# Patient Record
Sex: FEMALE | Race: BLACK OR AFRICAN AMERICAN | NOT HISPANIC OR LATINO
[De-identification: names, ages, dates, MRNs, and addresses within clinical notes are randomized per-mention and may not be internally consistent; named-entity substitution may affect disease eponyms.]

---

## 2017-08-30 ENCOUNTER — APPOINTMENT (OUTPATIENT)
Dept: OBGYN | Facility: CLINIC | Age: 32
End: 2017-08-30
Payer: COMMERCIAL

## 2017-08-30 VITALS
SYSTOLIC BLOOD PRESSURE: 128 MMHG | BODY MASS INDEX: 46.61 KG/M2 | HEIGHT: 66 IN | WEIGHT: 290 LBS | DIASTOLIC BLOOD PRESSURE: 82 MMHG

## 2017-08-30 PROCEDURE — 99395 PREV VISIT EST AGE 18-39: CPT

## 2017-08-31 LAB — HPV HIGH+LOW RISK DNA PNL CVX: NEGATIVE

## 2017-09-05 LAB — CYTOLOGY CVX/VAG DOC THIN PREP: NORMAL

## 2017-09-11 ENCOUNTER — APPOINTMENT (OUTPATIENT)
Dept: OBGYN | Facility: CLINIC | Age: 32
End: 2017-09-11

## 2017-09-11 ENCOUNTER — RX RENEWAL (OUTPATIENT)
Age: 32
End: 2017-09-11

## 2017-09-28 ENCOUNTER — APPOINTMENT (OUTPATIENT)
Dept: OBGYN | Facility: CLINIC | Age: 32
End: 2017-09-28

## 2017-10-30 ENCOUNTER — APPOINTMENT (OUTPATIENT)
Dept: OBGYN | Facility: CLINIC | Age: 32
End: 2017-10-30

## 2018-04-09 ENCOUNTER — ASOB RESULT (OUTPATIENT)
Age: 33
End: 2018-04-09

## 2018-04-09 ENCOUNTER — APPOINTMENT (OUTPATIENT)
Dept: OBGYN | Facility: CLINIC | Age: 33
End: 2018-04-09
Payer: COMMERCIAL

## 2018-04-09 PROCEDURE — 76830 TRANSVAGINAL US NON-OB: CPT

## 2018-04-17 ENCOUNTER — RX RENEWAL (OUTPATIENT)
Age: 33
End: 2018-04-17

## 2018-04-19 ENCOUNTER — APPOINTMENT (OUTPATIENT)
Dept: OBGYN | Facility: CLINIC | Age: 33
End: 2018-04-19
Payer: COMMERCIAL

## 2018-04-19 PROCEDURE — 99214 OFFICE O/P EST MOD 30 MIN: CPT

## 2018-04-19 RX ORDER — NORETHINDRONE ACETATE AND ETHINYL ESTRADIOL 1MG-20(21)
1-20 KIT ORAL
Qty: 84 | Refills: 0 | Status: DISCONTINUED | COMMUNITY
Start: 2018-04-17 | End: 2018-04-19

## 2018-05-07 ENCOUNTER — MESSAGE (OUTPATIENT)
Age: 33
End: 2018-05-07

## 2018-06-05 ENCOUNTER — APPOINTMENT (OUTPATIENT)
Dept: OBGYN | Facility: CLINIC | Age: 33
End: 2018-06-05
Payer: COMMERCIAL

## 2018-06-05 VITALS
HEIGHT: 66 IN | WEIGHT: 293 LBS | HEART RATE: 90 BPM | SYSTOLIC BLOOD PRESSURE: 115 MMHG | BODY MASS INDEX: 47.09 KG/M2 | DIASTOLIC BLOOD PRESSURE: 78 MMHG

## 2018-06-05 DIAGNOSIS — Z92.89 PERSONAL HISTORY OF OTHER MEDICAL TREATMENT: ICD-10-CM

## 2018-06-05 DIAGNOSIS — Z87.42 PERSONAL HISTORY OF OTHER DISEASES OF THE FEMALE GENITAL TRACT: ICD-10-CM

## 2018-06-05 DIAGNOSIS — R89.6 ABNORMAL CYTOLOGICAL FINDINGS IN SPECIMENS FROM OTHER ORGANS, SYSTEMS AND TISSUES: ICD-10-CM

## 2018-06-05 PROCEDURE — 99215 OFFICE O/P EST HI 40 MIN: CPT

## 2018-06-07 ENCOUNTER — APPOINTMENT (OUTPATIENT)
Dept: MRI IMAGING | Facility: CLINIC | Age: 33
End: 2018-06-07
Payer: COMMERCIAL

## 2018-06-07 ENCOUNTER — OUTPATIENT (OUTPATIENT)
Dept: OUTPATIENT SERVICES | Facility: HOSPITAL | Age: 33
LOS: 1 days | End: 2018-06-07
Payer: COMMERCIAL

## 2018-06-07 DIAGNOSIS — N85.2 HYPERTROPHY OF UTERUS: ICD-10-CM

## 2018-06-07 DIAGNOSIS — D50.0 IRON DEFICIENCY ANEMIA SECONDARY TO BLOOD LOSS (CHRONIC): ICD-10-CM

## 2018-06-07 DIAGNOSIS — D25.9 LEIOMYOMA OF UTERUS, UNSPECIFIED: ICD-10-CM

## 2018-06-07 DIAGNOSIS — N92.0 EXCESSIVE AND FREQUENT MENSTRUATION WITH REGULAR CYCLE: ICD-10-CM

## 2018-06-07 PROCEDURE — A9585: CPT

## 2018-06-07 PROCEDURE — 72197 MRI PELVIS W/O & W/DYE: CPT

## 2018-06-07 PROCEDURE — 72197 MRI PELVIS W/O & W/DYE: CPT | Mod: 26

## 2018-06-11 ENCOUNTER — RESULT REVIEW (OUTPATIENT)
Age: 33
End: 2018-06-11

## 2018-07-12 ENCOUNTER — APPOINTMENT (OUTPATIENT)
Dept: OBGYN | Facility: CLINIC | Age: 33
End: 2018-07-12
Payer: COMMERCIAL

## 2018-07-12 VITALS
BODY MASS INDEX: 47.09 KG/M2 | HEIGHT: 66 IN | WEIGHT: 293 LBS | HEART RATE: 82 BPM | DIASTOLIC BLOOD PRESSURE: 84 MMHG | SYSTOLIC BLOOD PRESSURE: 130 MMHG

## 2018-07-12 PROCEDURE — 99214 OFFICE O/P EST MOD 30 MIN: CPT

## 2018-08-08 ENCOUNTER — RX RENEWAL (OUTPATIENT)
Age: 33
End: 2018-08-08

## 2018-09-05 ENCOUNTER — OUTPATIENT (OUTPATIENT)
Dept: OUTPATIENT SERVICES | Facility: HOSPITAL | Age: 33
LOS: 1 days | End: 2018-09-05
Payer: COMMERCIAL

## 2018-09-05 VITALS
HEIGHT: 66 IN | TEMPERATURE: 97 F | RESPIRATION RATE: 15 BRPM | DIASTOLIC BLOOD PRESSURE: 80 MMHG | WEIGHT: 293 LBS | SYSTOLIC BLOOD PRESSURE: 111 MMHG | HEART RATE: 78 BPM | OXYGEN SATURATION: 97 %

## 2018-09-05 DIAGNOSIS — Z29.9 ENCOUNTER FOR PROPHYLACTIC MEASURES, UNSPECIFIED: ICD-10-CM

## 2018-09-05 DIAGNOSIS — E66.9 OBESITY, UNSPECIFIED: ICD-10-CM

## 2018-09-05 DIAGNOSIS — N85.2 HYPERTROPHY OF UTERUS: ICD-10-CM

## 2018-09-05 DIAGNOSIS — Z01.818 ENCOUNTER FOR OTHER PREPROCEDURAL EXAMINATION: ICD-10-CM

## 2018-09-05 DIAGNOSIS — D25.9 LEIOMYOMA OF UTERUS, UNSPECIFIED: ICD-10-CM

## 2018-09-05 DIAGNOSIS — D50.0 IRON DEFICIENCY ANEMIA SECONDARY TO BLOOD LOSS (CHRONIC): ICD-10-CM

## 2018-09-05 DIAGNOSIS — N92.0 EXCESSIVE AND FREQUENT MENSTRUATION WITH REGULAR CYCLE: ICD-10-CM

## 2018-09-05 LAB
BLD GP AB SCN SERPL QL: NEGATIVE — SIGNIFICANT CHANGE UP
HCT VFR BLD CALC: 43.5 % — SIGNIFICANT CHANGE UP (ref 34.5–45)
HGB BLD-MCNC: 13.8 G/DL — SIGNIFICANT CHANGE UP (ref 11.5–15.5)
MCHC RBC-ENTMCNC: 24.6 PG — LOW (ref 27–34)
MCHC RBC-ENTMCNC: 31.7 GM/DL — LOW (ref 32–36)
MCV RBC AUTO: 77.4 FL — LOW (ref 80–100)
PLATELET # BLD AUTO: 325 K/UL — SIGNIFICANT CHANGE UP (ref 150–400)
RBC # BLD: 5.62 M/UL — HIGH (ref 3.8–5.2)
RBC # FLD: 19.1 % — HIGH (ref 10.3–14.5)
RH IG SCN BLD-IMP: POSITIVE — SIGNIFICANT CHANGE UP
WBC # BLD: 6.68 K/UL — SIGNIFICANT CHANGE UP (ref 3.8–10.5)
WBC # FLD AUTO: 6.68 K/UL — SIGNIFICANT CHANGE UP (ref 3.8–10.5)

## 2018-09-05 PROCEDURE — 85027 COMPLETE CBC AUTOMATED: CPT

## 2018-09-05 PROCEDURE — G0463: CPT

## 2018-09-05 PROCEDURE — 86850 RBC ANTIBODY SCREEN: CPT

## 2018-09-05 PROCEDURE — 86900 BLOOD TYPING SEROLOGIC ABO: CPT

## 2018-09-05 PROCEDURE — 86901 BLOOD TYPING SEROLOGIC RH(D): CPT

## 2018-09-05 RX ORDER — SODIUM CHLORIDE 9 MG/ML
3 INJECTION INTRAMUSCULAR; INTRAVENOUS; SUBCUTANEOUS EVERY 8 HOURS
Qty: 0 | Refills: 0 | Status: DISCONTINUED | OUTPATIENT
Start: 2018-09-14 | End: 2018-09-14

## 2018-09-05 RX ORDER — CEFOTETAN DISODIUM 1 G
2 VIAL (EA) INJECTION ONCE
Qty: 0 | Refills: 0 | Status: DISCONTINUED | OUTPATIENT
Start: 2018-09-14 | End: 2018-09-17

## 2018-09-05 NOTE — H&P PST ADULT - PMH
Anemia  on Iron  Leiomyoma of uterus    Viral encephalitis  hospitalized at [facility] 2011

## 2018-09-05 NOTE — H&P PST ADULT - ASSESSMENT
CAPRINI SCORE [CLOT]    AGE RELATED RISK FACTORS                                                       MOBILITY RELATED FACTORS  [ ] Age 41-60 years                                            (1 Point)                  [ ] Bed rest                                                        (1 Point)  [ ] Age: 61-74 years                                           (2 Points)                 [ ] Plaster cast                                                   (2 Points)  [ ] Age= 75 years                                              (3 Points)                 [ ] Bed bound for more than 72 hours                 (2 Points)    DISEASE RELATED RISK FACTORS                                               GENDER SPECIFIC FACTORS  [ ] Edema in the lower extremities                       (1 Point)                  [ ] Pregnancy                                                     (1 Point)  [ ] Varicose veins                                               (1 Point)                  [ ] Post-partum < 6 weeks                                   (1 Point)             [x] BMI > 25 Kg/m2                                            (1 Point)                  [ ] Hormonal therapy  or oral contraception          (1 Point)                 [ ] Sepsis (in the previous month)                        (1 Point)                  [ ] History of pregnancy complications                 (1 point)  [ ] Pneumonia or serious lung disease                                               [ ] Unexplained or recurrent                     (1 Point)           (in the previous month)                               (1 Point)  [ ] Abnormal pulmonary function test                     (1 Point)                 SURGERY RELATED RISK FACTORS  [ ] Acute myocardial infarction                              (1 Point)                 [ ]  Section                                             (1 Point)  [ ] Congestive heart failure (in the previous month)  (1 Point)               [ ] Minor surgery                                                  (1 Point)   [ ] Inflammatory bowel disease                             (1 Point)                 [ ] Arthroscopic surgery                                        (2 Points)  [ ] Central venous access                                      (2 Points)                [x] General surgery lasting more than 45 minutes   (2 Points)       [ ] Stroke (in the previous month)                          (5 Points)               [ ] Elective arthroplasty                                         (5 Points)                                                                                                                                               HEMATOLOGY RELATED FACTORS                                                 TRAUMA RELATED RISK FACTORS  [ ] Prior episodes of VTE                                     (3 Points)                 [ ] Fracture of the hip, pelvis, or leg                       (5 Points)  [ ] Positive family history for VTE                         (3 Points)                 [ ] Acute spinal cord injury (in the previous month)  (5 Points)  [ ] Prothrombin 85068 A                                     (3 Points)                 [ ] Paralysis  (less than 1 month)                             (5 Points)  [ ] Factor V Leiden                                             (3 Points)                  [ ] Multiple Trauma within 1 month                        (5 Points)  [ ] Lupus anticoagulants                                     (3 Points)                                                           [ ] Anticardiolipin antibodies                               (3 Points)                                                       [ ] High homocysteine in the blood                      (3 Points)                                             [ ] Other congenital or acquired thrombophilia      (3 Points)                                                [ ] Heparin induced thrombocytopenia                  (3 Points)                                          Total Score [   3     ]

## 2018-09-05 NOTE — H&P PST ADULT - HISTORY OF PRESENT ILLNESS
34 y/o AA female with Hx of fibroids 32 y/o AA female with Hx of fibroids 34 y/o AA female with Hx of fibroids c/o worsening menorrhagia, abdominal discomfort and anemia over the past year. Eval by Dr. Jiang-s/p MRI revealed 2 uterine fibroids. Presents to PST for a scheduled abdominal myomectomy on 9/14/2018.

## 2018-09-05 NOTE — H&P PST ADULT - ACTIVITY
walks, grocery shopping, yardwork, able to tolerate moderate activities walks, grocery shopping, yard work, able to participate in moderate activities

## 2018-09-13 ENCOUNTER — TRANSCRIPTION ENCOUNTER (OUTPATIENT)
Age: 33
End: 2018-09-13

## 2018-09-14 ENCOUNTER — RESULT REVIEW (OUTPATIENT)
Age: 33
End: 2018-09-14

## 2018-09-14 ENCOUNTER — INPATIENT (INPATIENT)
Facility: HOSPITAL | Age: 33
LOS: 2 days | Discharge: HOME | DRG: 742 | End: 2018-09-17
Attending: OBSTETRICS & GYNECOLOGY | Admitting: OBSTETRICS & GYNECOLOGY
Payer: COMMERCIAL

## 2018-09-14 ENCOUNTER — APPOINTMENT (OUTPATIENT)
Dept: OBGYN | Facility: HOSPITAL | Age: 33
End: 2018-09-14

## 2018-09-14 ENCOUNTER — TRANSCRIPTION ENCOUNTER (OUTPATIENT)
Age: 33
End: 2018-09-14

## 2018-09-14 VITALS
HEIGHT: 66 IN | SYSTOLIC BLOOD PRESSURE: 110 MMHG | DIASTOLIC BLOOD PRESSURE: 81 MMHG | TEMPERATURE: 98 F | WEIGHT: 293 LBS | OXYGEN SATURATION: 100 % | RESPIRATION RATE: 16 BRPM | HEART RATE: 82 BPM

## 2018-09-14 DIAGNOSIS — D50.0 IRON DEFICIENCY ANEMIA SECONDARY TO BLOOD LOSS (CHRONIC): ICD-10-CM

## 2018-09-14 DIAGNOSIS — D25.9 LEIOMYOMA OF UTERUS, UNSPECIFIED: ICD-10-CM

## 2018-09-14 LAB
HCG UR QL: NEGATIVE — SIGNIFICANT CHANGE UP
HCT VFR BLD CALC: 38.8 % — SIGNIFICANT CHANGE UP (ref 34.5–45)
HGB BLD-MCNC: 12.3 G/DL — SIGNIFICANT CHANGE UP (ref 11.5–15.5)
MCHC RBC-ENTMCNC: 24.7 PG — LOW (ref 27–34)
MCHC RBC-ENTMCNC: 31.7 GM/DL — LOW (ref 32–36)
MCV RBC AUTO: 77.9 FL — LOW (ref 80–100)
PLATELET # BLD AUTO: 237 K/UL — SIGNIFICANT CHANGE UP (ref 150–400)
RBC # BLD: 4.98 M/UL — SIGNIFICANT CHANGE UP (ref 3.8–5.2)
RBC # FLD: 17.7 % — HIGH (ref 10.3–14.5)
WBC # BLD: 13.2 K/UL — HIGH (ref 3.8–10.5)
WBC # FLD AUTO: 13.2 K/UL — HIGH (ref 3.8–10.5)

## 2018-09-14 PROCEDURE — 88305 TISSUE EXAM BY PATHOLOGIST: CPT | Mod: 26

## 2018-09-14 PROCEDURE — 58140 MYOMECTOMY ABDOM METHOD: CPT | Mod: 80

## 2018-09-14 PROCEDURE — 58140 MYOMECTOMY ABDOM METHOD: CPT

## 2018-09-14 RX ORDER — ACETAMINOPHEN 500 MG
975 TABLET ORAL ONCE
Qty: 0 | Refills: 0 | Status: COMPLETED | OUTPATIENT
Start: 2018-09-14 | End: 2018-09-14

## 2018-09-14 RX ORDER — LIDOCAINE HCL 20 MG/ML
0.2 VIAL (ML) INJECTION ONCE
Qty: 0 | Refills: 0 | Status: COMPLETED | OUTPATIENT
Start: 2018-09-14 | End: 2018-09-14

## 2018-09-14 RX ORDER — SODIUM CHLORIDE 9 MG/ML
1000 INJECTION, SOLUTION INTRAVENOUS
Qty: 0 | Refills: 0 | Status: DISCONTINUED | OUTPATIENT
Start: 2018-09-14 | End: 2018-09-15

## 2018-09-14 RX ORDER — OXYCODONE HYDROCHLORIDE 5 MG/1
1 TABLET ORAL
Qty: 8 | Refills: 0 | OUTPATIENT
Start: 2018-09-14 | End: 2018-09-15

## 2018-09-14 RX ORDER — ACETAMINOPHEN 500 MG
1000 TABLET ORAL ONCE
Qty: 0 | Refills: 0 | Status: COMPLETED | OUTPATIENT
Start: 2018-09-14 | End: 2018-09-14

## 2018-09-14 RX ORDER — MEDROXYPROGESTERONE ACETATE 150 MG/ML
1 INJECTION, SUSPENSION, EXTENDED RELEASE INTRAMUSCULAR
Qty: 0 | Refills: 0 | COMMUNITY

## 2018-09-14 RX ORDER — NALOXONE HYDROCHLORIDE 4 MG/.1ML
0.1 SPRAY NASAL
Qty: 0 | Refills: 0 | Status: DISCONTINUED | OUTPATIENT
Start: 2018-09-14 | End: 2018-09-17

## 2018-09-14 RX ORDER — HYDROMORPHONE HYDROCHLORIDE 2 MG/ML
0.5 INJECTION INTRAMUSCULAR; INTRAVENOUS; SUBCUTANEOUS
Qty: 0 | Refills: 0 | Status: DISCONTINUED | OUTPATIENT
Start: 2018-09-14 | End: 2018-09-14

## 2018-09-14 RX ORDER — OXYCODONE HYDROCHLORIDE 5 MG/1
1 TABLET ORAL
Qty: 12 | Refills: 0 | OUTPATIENT
Start: 2018-09-14 | End: 2018-09-16

## 2018-09-14 RX ORDER — HEPARIN SODIUM 5000 [USP'U]/ML
5000 INJECTION INTRAVENOUS; SUBCUTANEOUS EVERY 12 HOURS
Qty: 0 | Refills: 0 | Status: DISCONTINUED | OUTPATIENT
Start: 2018-09-14 | End: 2018-09-17

## 2018-09-14 RX ORDER — ONDANSETRON 8 MG/1
4 TABLET, FILM COATED ORAL EVERY 4 HOURS
Qty: 0 | Refills: 0 | Status: DISCONTINUED | OUTPATIENT
Start: 2018-09-14 | End: 2018-09-14

## 2018-09-14 RX ORDER — FERROUS SULFATE 325(65) MG
1 TABLET ORAL
Qty: 0 | Refills: 0 | COMMUNITY

## 2018-09-14 RX ORDER — CELECOXIB 200 MG/1
200 CAPSULE ORAL ONCE
Qty: 0 | Refills: 0 | Status: COMPLETED | OUTPATIENT
Start: 2018-09-14 | End: 2018-09-14

## 2018-09-14 RX ORDER — ONDANSETRON 8 MG/1
4 TABLET, FILM COATED ORAL EVERY 6 HOURS
Qty: 0 | Refills: 0 | Status: DISCONTINUED | OUTPATIENT
Start: 2018-09-14 | End: 2018-09-17

## 2018-09-14 RX ORDER — FAMOTIDINE 10 MG/ML
20 INJECTION INTRAVENOUS ONCE
Qty: 0 | Refills: 0 | Status: COMPLETED | OUTPATIENT
Start: 2018-09-14 | End: 2018-09-14

## 2018-09-14 RX ORDER — ACETAMINOPHEN 500 MG
650 TABLET ORAL EVERY 6 HOURS
Qty: 0 | Refills: 0 | Status: DISCONTINUED | OUTPATIENT
Start: 2018-09-14 | End: 2018-09-17

## 2018-09-14 RX ORDER — DIPHENHYDRAMINE HCL 50 MG
50 CAPSULE ORAL EVERY 4 HOURS
Qty: 0 | Refills: 0 | Status: DISCONTINUED | OUTPATIENT
Start: 2018-09-14 | End: 2018-09-17

## 2018-09-14 RX ORDER — DOCUSATE SODIUM 100 MG
100 CAPSULE ORAL THREE TIMES A DAY
Qty: 0 | Refills: 0 | Status: DISCONTINUED | OUTPATIENT
Start: 2018-09-14 | End: 2018-09-17

## 2018-09-14 RX ADMIN — Medication 1000 MILLIGRAM(S): at 20:09

## 2018-09-14 RX ADMIN — SODIUM CHLORIDE 125 MILLILITER(S): 9 INJECTION, SOLUTION INTRAVENOUS at 22:27

## 2018-09-14 RX ADMIN — HYDROMORPHONE HYDROCHLORIDE 0.5 MILLIGRAM(S): 2 INJECTION INTRAMUSCULAR; INTRAVENOUS; SUBCUTANEOUS at 12:14

## 2018-09-14 RX ADMIN — HYDROMORPHONE HYDROCHLORIDE 0.5 MILLIGRAM(S): 2 INJECTION INTRAMUSCULAR; INTRAVENOUS; SUBCUTANEOUS at 12:29

## 2018-09-14 RX ADMIN — Medication 400 MILLIGRAM(S): at 19:54

## 2018-09-14 RX ADMIN — Medication 0.2 MILLILITER(S): at 06:03

## 2018-09-14 RX ADMIN — HEPARIN SODIUM 5000 UNIT(S): 5000 INJECTION INTRAVENOUS; SUBCUTANEOUS at 22:28

## 2018-09-14 RX ADMIN — FAMOTIDINE 20 MILLIGRAM(S): 10 INJECTION INTRAVENOUS at 05:54

## 2018-09-14 RX ADMIN — CELECOXIB 200 MILLIGRAM(S): 200 CAPSULE ORAL at 05:54

## 2018-09-14 RX ADMIN — Medication 975 MILLIGRAM(S): at 05:54

## 2018-09-14 RX ADMIN — Medication 100 MILLIGRAM(S): at 22:28

## 2018-09-14 RX ADMIN — SODIUM CHLORIDE 3 MILLILITER(S): 9 INJECTION INTRAMUSCULAR; INTRAVENOUS; SUBCUTANEOUS at 06:02

## 2018-09-14 NOTE — DISCHARGE NOTE ADULT - ADDITIONAL INSTRUCTIONS
Call Dr. Jiang's office to schedule your follow-up appointment next Tuesday 9/25. The uterine beach balloon will be taken out at that time.

## 2018-09-14 NOTE — DISCHARGE NOTE ADULT - MEDICATION SUMMARY - MEDICATIONS TO TAKE
I will START or STAY ON the medications listed below when I get home from the hospital:    oxyCODONE 5 mg oral tablet  -- 1 tab(s) by mouth every 6 hours, As Needed -for severe pain MDD:4 tabs  -- Caution federal law prohibits the transfer of this drug to any person other  than the person for whom it was prescribed.  It is very important that you take or use this exactly as directed.  Do not skip doses or discontinue unless directed by your doctor.  May cause drowsiness.  Alcohol may intensify this effect.  Use care when operating dangerous machinery.  This prescription cannot be refilled.  Using more of this medication than prescribed may cause serious breathing problems.    -- Indication: For Severe pain    Depo-Provera 400 mg/mL intramuscular suspension  -- 1 dose(s) intramuscular every 3 months  -- Indication: For home med I will START or STAY ON the medications listed below when I get home from the hospital:    oxyCODONE 5 mg oral tablet  -- 1 tab(s) by mouth every 6 hours, As Needed -for severe pain MDD:4 tabs  -- Caution federal law prohibits the transfer of this drug to any person other  than the person for whom it was prescribed.  It is very important that you take or use this exactly as directed.  Do not skip doses or discontinue unless directed by your doctor.  May cause drowsiness.  Alcohol may intensify this effect.  Use care when operating dangerous machinery.  This prescription cannot be refilled.  Using more of this medication than prescribed may cause serious breathing problems.    -- Indication: For pain    doxycycline monohydrate 100 mg oral capsule  -- 1 cap(s) by mouth every 12 hours  -- Indication: For Uterine beach    Depo-Provera 400 mg/mL intramuscular suspension  -- 1 dose(s) intramuscular every 3 months  -- Indication: For d/c    Estrace 2 mg oral tablet  -- 1 tab(s) by mouth once a day   -- Do not take this drug if you are pregnant.  It is very important that you take or use this exactly as directed.  Do not skip doses or discontinue unless directed by your doctor.    -- Indication: For Uterine beach

## 2018-09-14 NOTE — DISCHARGE NOTE ADULT - HOSPITAL COURSE
Pt is a 34 y/o F admitted on 9/14/18 for a scheduled surgery who underwent a abdominal myomectomy with Dr. Charles Jiang without complications. See operative note for full details. Patient had uncomplicated hospital course, and met all post operative milestones. At time of discharge patient was voiding , tolerating regular diet, ambulating, stable vital signs, afebrile and pain controlled on PO medications. Pt will have close interval follow up with Dr. Charles Jiang. Pt is a 32 y/o F admitted on 9/14/18 for a scheduled surgery who underwent a abdominal myomectomy with Dr. Charles Jiang without complications. See operative note for full details. Patient met all post operative milestones. Patient experienced post-op tachycardia which was improving at the time of discharge. EKG showing sinus tachycardia and patient asymptomatic. At time of discharge patient was voiding , tolerating regular diet, ambulating, stable vital signs, afebrile and pain controlled on PO medications. Pt will have close interval follow up with Dr. Charles Jiang.                10.1   8.7   )-----------( 202      ( 09-17 @ 06:35 )             30.7                10.1   10.7  )-----------( 187      ( 09-16 @ 06:43 )             31.0                10.7   11.3  )-----------( 207      ( 09-15 @ 14:58 )             34.5                10.9   11.6  )-----------( 220      ( 09-15 @ 07:07 )             33.0                12.3   13.2  )-----------( 237      ( 09-14 @ 14:10 )             38.8

## 2018-09-14 NOTE — DISCHARGE NOTE ADULT - MEDICATION SUMMARY - MEDICATIONS TO STOP TAKING
I will STOP taking the medications listed below when I get home from the hospital:  None I will STOP taking the medications listed below when I get home from the hospital:    Depo-Provera 400 mg/mL intramuscular suspension  -- 1 dose(s) intramuscular every 3 months

## 2018-09-14 NOTE — DISCHARGE NOTE ADULT - CARE PROVIDER_API CALL
Charles Jiagn (MD), Obstetrics and Gynecology  [facility] [facility]  [address] [place] [address]  Phone: [phone]  Fax: [phone]

## 2018-09-14 NOTE — PROGRESS NOTE ADULT - PROBLEM SELECTOR PLAN 1
1. Neuro: Analgesia PRN. acetaminophen   Tablet .. 650 milliGRAM(s) Oral every 6 hours PRN  HYDROmorphone  Injectable 0.5 milliGRAM(s) IV Push every 10 minutes PRN  HYDROmorphone (10 MICROgram(s)/mL) + BUpivacaine 0.0625% in 0.9% Sodium Chloride PCEA 250 milliLiter(s) Epidural PCA Continuous  HYDROmorphone (10 MICROgram(s)/mL) + BUpivacaine 0.0625% in 0.9% Sodium Chloride PCEA Rescue Clinician  Bolus 5 milliLiter(s) Epidural every 15 minutes PRN  ondansetron Injectable 4 milliGRAM(s) IV Push every 6 hours PRN  ondansetron Injectable 4 milliGRAM(s) IV Push every 4 hours PRN   2. Card: mild tachycardia- CBC stable. will monitor VS. CBC in AM.   3. Pulm: Incentive spirometer use. no active issues  4. GI: Advance to regular diet. Anti-emetics PRN.  5. : Castellanos to gravity. uterine balloon in place. Doxycycline ordered  6. Electrolytes: LR@125cc/hr. BMP in AM.   7. DVT ppx w/ PAS while in bed. Early ambulation, initially with assistance then as tolerated.  8. Discharge to floor from PACU when criteria met.   d/w GYN team.

## 2018-09-14 NOTE — DISCHARGE NOTE ADULT - PATIENT PORTAL LINK FT
You can access the [facility] Patient Portal, offered by [facility], by registering with the following website: [email or web address]

## 2018-09-14 NOTE — BRIEF OPERATIVE NOTE - SPECIMENS
14 submucosal fibroids, 15 intramural fibroids 14 submucosal fibroids, 15 intramural fibroids, ovarian lesion

## 2018-09-14 NOTE — PATIENT PROFILE ADULT. - PMH
Anemia  on Iron  Leiomyoma of uterus    Viral encephalitis  hospitalized at [facility] 2011

## 2018-09-14 NOTE — BRIEF OPERATIVE NOTE - OPERATION/FINDINGS
20 wk size uterus with multiple myomas. Large 10cm exophytic fibroid with adhesions to left ovary. Another large 10cm submucosal fibroid noted. Total of 14 submucosal fibroids noted and 15 intramural exised. Entrance into uterine cavity noted, 30cc beach balloon postoperatively.

## 2018-09-14 NOTE — DISCHARGE NOTE ADULT - CARE PLAN
Principal Discharge DX:	Leiomyoma of uterus  Goal:	Recovery  Assessment and plan of treatment:	nothing in vagina (sex, tampons, douching) no heavy lifting (>10 lbs) for 6 weeks. Please return to ER or call your doctors office if pain is worsening, you are unable to keep down food or drink, fever >101, heavy vaginal bleeding. You are able to take a shower regularly.

## 2018-09-15 DIAGNOSIS — Z98.890 OTHER SPECIFIED POSTPROCEDURAL STATES: ICD-10-CM

## 2018-09-15 LAB
BASOPHILS # BLD AUTO: 0 K/UL — SIGNIFICANT CHANGE UP (ref 0–0.2)
BASOPHILS NFR BLD AUTO: 0.1 % — SIGNIFICANT CHANGE UP (ref 0–2)
EOSINOPHIL # BLD AUTO: 0 K/UL — SIGNIFICANT CHANGE UP (ref 0–0.5)
EOSINOPHIL NFR BLD AUTO: 0.1 % — SIGNIFICANT CHANGE UP (ref 0–6)
HCT VFR BLD CALC: 33 % — LOW (ref 34.5–45)
HCT VFR BLD CALC: 34.5 % — SIGNIFICANT CHANGE UP (ref 34.5–45)
HGB BLD-MCNC: 10.7 G/DL — LOW (ref 11.5–15.5)
HGB BLD-MCNC: 10.9 G/DL — LOW (ref 11.5–15.5)
LYMPHOCYTES # BLD AUTO: 1.6 K/UL — SIGNIFICANT CHANGE UP (ref 1–3.3)
LYMPHOCYTES # BLD AUTO: 14.1 % — SIGNIFICANT CHANGE UP (ref 13–44)
MCHC RBC-ENTMCNC: 24.2 PG — LOW (ref 27–34)
MCHC RBC-ENTMCNC: 25.6 PG — LOW (ref 27–34)
MCHC RBC-ENTMCNC: 31 GM/DL — LOW (ref 32–36)
MCHC RBC-ENTMCNC: 33 GM/DL — SIGNIFICANT CHANGE UP (ref 32–36)
MCV RBC AUTO: 77.5 FL — LOW (ref 80–100)
MCV RBC AUTO: 77.9 FL — LOW (ref 80–100)
MONOCYTES # BLD AUTO: 0.6 K/UL — SIGNIFICANT CHANGE UP (ref 0–0.9)
MONOCYTES NFR BLD AUTO: 5.5 % — SIGNIFICANT CHANGE UP (ref 2–14)
NEUTROPHILS # BLD AUTO: 9.3 K/UL — HIGH (ref 1.8–7.4)
NEUTROPHILS NFR BLD AUTO: 80.3 % — HIGH (ref 43–77)
PLATELET # BLD AUTO: 207 K/UL — SIGNIFICANT CHANGE UP (ref 150–400)
PLATELET # BLD AUTO: 220 K/UL — SIGNIFICANT CHANGE UP (ref 150–400)
RBC # BLD: 4.26 M/UL — SIGNIFICANT CHANGE UP (ref 3.8–5.2)
RBC # BLD: 4.44 M/UL — SIGNIFICANT CHANGE UP (ref 3.8–5.2)
RBC # FLD: 17.5 % — HIGH (ref 10.3–14.5)
RBC # FLD: 17.5 % — HIGH (ref 10.3–14.5)
WBC # BLD: 11.3 K/UL — HIGH (ref 3.8–10.5)
WBC # BLD: 11.6 K/UL — HIGH (ref 3.8–10.5)
WBC # FLD AUTO: 11.3 K/UL — HIGH (ref 3.8–10.5)
WBC # FLD AUTO: 11.6 K/UL — HIGH (ref 3.8–10.5)

## 2018-09-15 PROCEDURE — 99024 POSTOP FOLLOW-UP VISIT: CPT

## 2018-09-15 PROCEDURE — 93010 ELECTROCARDIOGRAM REPORT: CPT

## 2018-09-15 RX ORDER — ACETAMINOPHEN 500 MG
1000 TABLET ORAL ONCE
Qty: 0 | Refills: 0 | Status: COMPLETED | OUTPATIENT
Start: 2018-09-15 | End: 2018-09-15

## 2018-09-15 RX ORDER — SIMETHICONE 80 MG/1
80 TABLET, CHEWABLE ORAL EVERY 6 HOURS
Qty: 0 | Refills: 0 | Status: DISCONTINUED | OUTPATIENT
Start: 2018-09-15 | End: 2018-09-17

## 2018-09-15 RX ORDER — OXYCODONE HYDROCHLORIDE 5 MG/1
5 TABLET ORAL EVERY 4 HOURS
Qty: 0 | Refills: 0 | Status: DISCONTINUED | OUTPATIENT
Start: 2018-09-15 | End: 2018-09-17

## 2018-09-15 RX ORDER — SODIUM CHLORIDE 9 MG/ML
3 INJECTION INTRAMUSCULAR; INTRAVENOUS; SUBCUTANEOUS EVERY 8 HOURS
Qty: 0 | Refills: 0 | Status: DISCONTINUED | OUTPATIENT
Start: 2018-09-15 | End: 2018-09-17

## 2018-09-15 RX ORDER — SODIUM CHLORIDE 9 MG/ML
500 INJECTION, SOLUTION INTRAVENOUS ONCE
Qty: 0 | Refills: 0 | Status: DISCONTINUED | OUTPATIENT
Start: 2018-09-15 | End: 2018-09-15

## 2018-09-15 RX ADMIN — SODIUM CHLORIDE 3 MILLILITER(S): 9 INJECTION INTRAMUSCULAR; INTRAVENOUS; SUBCUTANEOUS at 21:15

## 2018-09-15 RX ADMIN — HEPARIN SODIUM 5000 UNIT(S): 5000 INJECTION INTRAVENOUS; SUBCUTANEOUS at 12:21

## 2018-09-15 RX ADMIN — OXYCODONE HYDROCHLORIDE 5 MILLIGRAM(S): 5 TABLET ORAL at 15:30

## 2018-09-15 RX ADMIN — Medication 100 MILLIGRAM(S): at 21:09

## 2018-09-15 RX ADMIN — Medication 1000 MILLIGRAM(S): at 06:48

## 2018-09-15 RX ADMIN — Medication 650 MILLIGRAM(S): at 15:30

## 2018-09-15 RX ADMIN — OXYCODONE HYDROCHLORIDE 5 MILLIGRAM(S): 5 TABLET ORAL at 21:12

## 2018-09-15 RX ADMIN — OXYCODONE HYDROCHLORIDE 5 MILLIGRAM(S): 5 TABLET ORAL at 21:45

## 2018-09-15 RX ADMIN — HEPARIN SODIUM 5000 UNIT(S): 5000 INJECTION INTRAVENOUS; SUBCUTANEOUS at 21:09

## 2018-09-15 RX ADMIN — Medication 650 MILLIGRAM(S): at 14:38

## 2018-09-15 RX ADMIN — Medication 100 MILLIGRAM(S): at 12:21

## 2018-09-15 RX ADMIN — Medication 400 MILLIGRAM(S): at 06:33

## 2018-09-15 RX ADMIN — OXYCODONE HYDROCHLORIDE 5 MILLIGRAM(S): 5 TABLET ORAL at 14:37

## 2018-09-15 NOTE — PROGRESS NOTE ADULT - PROBLEM SELECTOR PLAN 1
Neuro: d/c PCEA, transition to PO pain meds  CV: hemodynamically stable, AM labs with appropriate drop in H&H  Pulm: saturating well on room air, encourage incentive spirometry  GI: advance to regular diet  : UOP adequate, uterine beach in place  Heme: c/w HSQ and SCDs for DVT ppx  Dispo: continue routine post-op care    D/w attending Dr. Apolinar Seo MD PGY1 Neuro: d/c PCEA, transition to PO pain meds  CV: hemodynamically stable, AM labs with appropriate drop in H&H  Pulm: saturating well on room air, encourage incentive spirometry  GI: advance to regular diet  : UOP adequate, uterine beach in place  Heme: c/w HSQ and SCDs for DVT ppx  Dispo: continue routine post-op care    D/w attending Dr. Apolinar Seo MD PGY2

## 2018-09-15 NOTE — CHART NOTE - NSCHARTNOTEFT_GEN_A_CORE
URBAN MANCIA [id number]    R2 GYN Chart note    POD#1   HD#2    Pt seen at bedside due to tachycardia overnight.    Patient seen and examined at bedside.  Pt reports she has increased pain this AM compared to yesterday evening.  Pt reports increased mucous secretions since yesterday because she is nervous to cough due to abd pain. Castellanos is still in place.   Denies CP, SOB, N/V, fevers, and chills.    Vital Signs Last 24 Hours  T(C): 37.7 (09-15-18 @ 04:57), Max: 37.7 (09-15-18 @ 04:57)  HR: 120 (09-15-18 @ 05:04) (82 - 124)  BP: 107/74 (09-15-18 @ 04:57) (101/67 - 133/83)  RR: 18 (09-15-18 @ 04:57) (16 - 18)  SpO2: 96% (09-15-18 @ 04:57) (93% - 100%)    I&O's Summary    14 Sep 2018 07:01  -  15 Sep 2018 05:45  --------------------------------------------------------  IN: 2650 mL / OUT: 2985 mL / NET: -335 mL      Physical Exam:  General: NAD  CV: tachycardic to 120s, S1, S2, no M/R/G  Lungs: inspiratory and expiratory wheeze b/l, resolved after pt coughed   Abdomen: Soft, appropriately tender, mildly distended, +BS  Incision:  clean/dry/intact- dermabond  Ext: No pain or swelling     EKG: normal sinus rhythm, sinus tachycardia    Labs:                        12.3   13.2  )-----------( 237      ( 14 Sep 2018 14:10 )             38.8       A/P: 33y  s/p abd myomectomy.  Pt seen due to tachycardia.  Pt tachycardic throughout post operative course.  ddx: depleted intravascular volume due to blood loss, URI due to b/l wheezes, tachycardia due to increased pain. Low suspicion for cardiac abnormality given normal EKG. Low suspicion for intraabd bleeding due to adequate UOP and otherwise normal vitals.     Neuro: PCEA, tylenol  CV: Pending AM CBC  Pulm: Saturating well on room air, encourage oob/amb, incentive spirometer.  Possible URI- consider adding Robitussin to meds- will discuss with attending   GI: Continue regular diet  : UOP adequate  Heme: c/w HSQ and SCDs for DVT ppx  Dispo: Continue routine post-op care    Hanna Vogt PGY-2

## 2018-09-16 LAB
HCT VFR BLD CALC: 31 % — LOW (ref 34.5–45)
HGB BLD-MCNC: 10.1 G/DL — LOW (ref 11.5–15.5)
MCHC RBC-ENTMCNC: 25.5 PG — LOW (ref 27–34)
MCHC RBC-ENTMCNC: 32.6 GM/DL — SIGNIFICANT CHANGE UP (ref 32–36)
MCV RBC AUTO: 78.1 FL — LOW (ref 80–100)
PLATELET # BLD AUTO: 187 K/UL — SIGNIFICANT CHANGE UP (ref 150–400)
RBC # BLD: 3.97 M/UL — SIGNIFICANT CHANGE UP (ref 3.8–5.2)
RBC # FLD: 17.1 % — HIGH (ref 10.3–14.5)
WBC # BLD: 10.7 K/UL — HIGH (ref 3.8–10.5)
WBC # FLD AUTO: 10.7 K/UL — HIGH (ref 3.8–10.5)

## 2018-09-16 PROCEDURE — 99024 POSTOP FOLLOW-UP VISIT: CPT

## 2018-09-16 RX ORDER — MAGNESIUM HYDROXIDE 400 MG/1
30 TABLET, CHEWABLE ORAL ONCE
Qty: 0 | Refills: 0 | Status: COMPLETED | OUTPATIENT
Start: 2018-09-16 | End: 2018-09-16

## 2018-09-16 RX ADMIN — Medication 100 MILLIGRAM(S): at 10:54

## 2018-09-16 RX ADMIN — SIMETHICONE 80 MILLIGRAM(S): 80 TABLET, CHEWABLE ORAL at 12:28

## 2018-09-16 RX ADMIN — SIMETHICONE 80 MILLIGRAM(S): 80 TABLET, CHEWABLE ORAL at 18:30

## 2018-09-16 RX ADMIN — Medication 100 MILLIGRAM(S): at 21:12

## 2018-09-16 RX ADMIN — ONDANSETRON 4 MILLIGRAM(S): 8 TABLET, FILM COATED ORAL at 14:20

## 2018-09-16 RX ADMIN — HEPARIN SODIUM 5000 UNIT(S): 5000 INJECTION INTRAVENOUS; SUBCUTANEOUS at 10:19

## 2018-09-16 RX ADMIN — SODIUM CHLORIDE 3 MILLILITER(S): 9 INJECTION INTRAMUSCULAR; INTRAVENOUS; SUBCUTANEOUS at 21:09

## 2018-09-16 RX ADMIN — SODIUM CHLORIDE 3 MILLILITER(S): 9 INJECTION INTRAMUSCULAR; INTRAVENOUS; SUBCUTANEOUS at 14:20

## 2018-09-16 RX ADMIN — SIMETHICONE 80 MILLIGRAM(S): 80 TABLET, CHEWABLE ORAL at 00:14

## 2018-09-16 RX ADMIN — SIMETHICONE 80 MILLIGRAM(S): 80 TABLET, CHEWABLE ORAL at 05:21

## 2018-09-16 RX ADMIN — MAGNESIUM HYDROXIDE 30 MILLILITER(S): 400 TABLET, CHEWABLE ORAL at 10:54

## 2018-09-16 RX ADMIN — HEPARIN SODIUM 5000 UNIT(S): 5000 INJECTION INTRAVENOUS; SUBCUTANEOUS at 21:12

## 2018-09-16 RX ADMIN — SODIUM CHLORIDE 3 MILLILITER(S): 9 INJECTION INTRAMUSCULAR; INTRAVENOUS; SUBCUTANEOUS at 05:24

## 2018-09-16 NOTE — PROGRESS NOTE ADULT - PROBLEM SELECTOR PLAN 1
Neuro: c/w PO pain meds  CV: tachycardic, AM labs pending  Pulm: saturating well on room air, encourage incentive spirometry  GI: c/w regular diet  : voiding spontaneously  Heme: c/w HSQ and SCDs for DVT ppx  Dispo: continue routine post-op care    Neha Seo MD PGY2

## 2018-09-16 NOTE — PROVIDER CONTACT NOTE (OTHER) - SITUATION
s/p myomectomy
pt  with c/o palpitation while trasfering pt to chair from bed for the first time after surg.

## 2018-09-16 NOTE — PROGRESS NOTE ADULT - ATTENDING COMMENTS
Patient seen and examined  Agree with Above assessment and plan  will cont to monitor heart rate
Gyn Attg:     patient with postoperative tachycardia, adequate urine output asymptomatic.  Likely secondary to intraoperative blood loss will continue to monitor plan for repeat blood work at 2pm.

## 2018-09-17 VITALS
OXYGEN SATURATION: 98 % | HEART RATE: 95 BPM | DIASTOLIC BLOOD PRESSURE: 82 MMHG | RESPIRATION RATE: 18 BRPM | TEMPERATURE: 99 F | SYSTOLIC BLOOD PRESSURE: 118 MMHG

## 2018-09-17 LAB
HCT VFR BLD CALC: 30.7 % — LOW (ref 34.5–45)
HGB BLD-MCNC: 10.1 G/DL — LOW (ref 11.5–15.5)
MCHC RBC-ENTMCNC: 25.6 PG — LOW (ref 27–34)
MCHC RBC-ENTMCNC: 32.9 GM/DL — SIGNIFICANT CHANGE UP (ref 32–36)
MCV RBC AUTO: 77.8 FL — LOW (ref 80–100)
PLATELET # BLD AUTO: 202 K/UL — SIGNIFICANT CHANGE UP (ref 150–400)
RBC # BLD: 3.94 M/UL — SIGNIFICANT CHANGE UP (ref 3.8–5.2)
RBC # FLD: 17 % — HIGH (ref 10.3–14.5)
WBC # BLD: 8.7 K/UL — SIGNIFICANT CHANGE UP (ref 3.8–10.5)
WBC # FLD AUTO: 8.7 K/UL — SIGNIFICANT CHANGE UP (ref 3.8–10.5)

## 2018-09-17 PROCEDURE — 93005 ELECTROCARDIOGRAM TRACING: CPT

## 2018-09-17 PROCEDURE — C1889: CPT

## 2018-09-17 PROCEDURE — 85027 COMPLETE CBC AUTOMATED: CPT

## 2018-09-17 PROCEDURE — C1765: CPT

## 2018-09-17 PROCEDURE — 88305 TISSUE EXAM BY PATHOLOGIST: CPT

## 2018-09-17 PROCEDURE — 81025 URINE PREGNANCY TEST: CPT

## 2018-09-17 RX ORDER — OXYCODONE HYDROCHLORIDE 5 MG/1
1 TABLET ORAL
Qty: 10 | Refills: 0 | OUTPATIENT
Start: 2018-09-17

## 2018-09-17 RX ADMIN — OXYCODONE HYDROCHLORIDE 5 MILLIGRAM(S): 5 TABLET ORAL at 00:57

## 2018-09-17 RX ADMIN — OXYCODONE HYDROCHLORIDE 5 MILLIGRAM(S): 5 TABLET ORAL at 12:18

## 2018-09-17 RX ADMIN — SODIUM CHLORIDE 3 MILLILITER(S): 9 INJECTION INTRAMUSCULAR; INTRAVENOUS; SUBCUTANEOUS at 05:54

## 2018-09-17 RX ADMIN — HEPARIN SODIUM 5000 UNIT(S): 5000 INJECTION INTRAVENOUS; SUBCUTANEOUS at 10:58

## 2018-09-17 RX ADMIN — Medication 100 MILLIGRAM(S): at 10:58

## 2018-09-17 RX ADMIN — OXYCODONE HYDROCHLORIDE 5 MILLIGRAM(S): 5 TABLET ORAL at 01:27

## 2018-09-17 RX ADMIN — SIMETHICONE 80 MILLIGRAM(S): 80 TABLET, CHEWABLE ORAL at 09:39

## 2018-09-17 NOTE — PROGRESS NOTE ADULT - ASSESSMENT
33yoF PMH iron def anemia and fibroid uterus now POD#2 S/P abdominal myomectomy. Tachycardia post-op. Doing well post-op.
A/P: 33y Female PMH iron def anemia and fibroid uterus now POD 0 S/P abdominal myomectomy- doing well
A/P: 34yo s/p abdominal myomectomy POD# 3 for fibroid uterus. Patient w/ post-op tachycardia, but hemodynamically stable and doing well.
33yoF PMH iron def anemia and fibroid uterus now POD#1 S/P abdominal myomectomy. Tachycardia overnight. Doing well post-op.

## 2018-09-17 NOTE — PROGRESS NOTE ADULT - PROBLEM SELECTOR PLAN 1
Neuro: c/w PO pain meds  CV: hemodynamically stable  Pulm: saturating well on room air, encourage incentive spirometry  GI: c/w regular diet  : voiding spontaneously  Heme: c/w HSQ and SCDs for DVT ppx  Dispo: continue routine post-op care    Georgette Willson, PGY-1

## 2018-09-17 NOTE — PROGRESS NOTE ADULT - SUBJECTIVE AND OBJECTIVE BOX
[facility]    SUBJECTIVE: Patient doing well with PCEA    Pain Scale Score:   Refer to charted pain scores    THERAPY:  [X] Epidural Bupivacaine 0.0625% and Hydromorphone         [X] 10 micrograms/mL 	[ ] 5 micrograms/mL  [ ] Epidural Bupivacaine 0.0625% and Fentanyl 2 micrograms/mL  [ ] Epidural Ropivacaine 0.1% plain – 1 mg/mL    Demand dose: 3 mL  Lockout: 15 minutes  Continuous Rate: 6 mL/hr    MEDICATIONS  (STANDING):  cefoTEtan  IVPB 2 Gram(s) IV Intermittent once  doxycycline hyclate Capsule 100 milliGRAM(s) Oral every 12 hours  heparin  Injectable 5000 Unit(s) SubCutaneous every 12 hours  lactated ringers. 1000 milliLiter(s) (125 mL/Hr) IV Continuous <Continuous>    MEDICATIONS  (PRN):  acetaminophen   Tablet .. 650 milliGRAM(s) Oral every 6 hours PRN Mild Pain (1 - 3)  diphenhydrAMINE   Injectable 50 milliGRAM(s) IV Push every 4 hours PRN pruritis or nausea or sleeplessness  docusate sodium 100 milliGRAM(s) Oral three times a day PRN Stool Softening  naloxone Injectable 0.1 milliGRAM(s) IV Push every 3 minutes PRN For ANY of the following changes in patient status:  A. RR LESS THAN 10 breaths per minute, B. Oxygen saturation LESS THAN 90%, C. Sedation score of 6  ondansetron Injectable 4 milliGRAM(s) IV Push every 6 hours PRN nasea or pruritis  oxyCODONE    IR 5 milliGRAM(s) Oral every 4 hours PRN Severe Pain (7 - 10)      OBJECTIVE:    Assessment of Catheter Site:    [X] Epidural 	  [X] Dressing intact	[X] Site non-tender	[X] Site without erythema, discharge, edema  [ ] Epidural tubing and connection checked	[X] Gross neurological exam within normal limits  [X] Catheter removed – tip intact		[X] Afebrile	            [ ] Febrile: ___                          10.9   11.6  )-----------( 220      ( 15 Sep 2018 07:07 )             33.0     Vital Signs Last 24 Hrs  T(C): 36.7 (09-15-18 @ 10:09), Max: 37.7 (09-15-18 @ 04:57)  T(F): 98 (09-15-18 @ 10:09), Max: 99.8 (09-15-18 @ 04:57)  HR: 120 (09-15-18 @ 05:04) (103 - 124)  BP: 107/74 (09-15-18 @ 04:57) (101/67 - 133/83)  BP(mean): 81 (09-14-18 @ 15:15) (81 - 96)  RR: 18 (09-15-18 @ 04:57) (16 - 18)  SpO2: 94% (09-15-18 @ 10:09) (93% - 100%)      Sedation Score:	[X] Alert	[ ] Drowsy	[ ] Arousable  [ ] Asleep     [ ] Unresponsive    Side Effects:	[X] None	[ ] Nausea	[ ] Vomiting   [ ] Pruritus  		[ ] Weakness     [ ] Numbness	[ ] Other:    ASSESSMENT/ PLAN:    Therapy:	[ ] Continue   [X] Discontinue   [X] Change to PRN Analgesics   [ ] Change to PCA    Documentation and Verification of current medications:  [X] Done	[ ] Not done, not eligible, reason:
GYN Progress Note  33y s/p abdominal myomectomy POD#3  HD#4    Patient seen and examined at bedside. No acute overnight events. No acute complaints, pain well controlled. Pt had nausea and emesis x 1 overnight & given anti-emetic.   Patient is ambulating and tolerating clears. Passed flatus. Castellanos is still in place. Pt found to be tachycardic to the 120s in VS monitoring yesterday. Denies CP, SOB, palpitations, fevers, and chills.    Vital Signs Last 24 Hours  T(C): 36.8 (09-17-18 @ 05:47), Max: 37.5 (09-16-18 @ 14:48)  HR: 102 (09-17-18 @ 05:47) (101 - 123)  BP: 112/72 (09-17-18 @ 05:47) (94/60 - 144/92)  RR: 18 (09-17-18 @ 05:47) (17 - 18)  SpO2: 99% (09-17-18 @ 05:47) (95% - 100%)      09-15-18 @ 07:01  -  09-16-18 @ 07:00  --------------------------------------------------------  IN: 0.09 mL/kg/hr / OUT: 0.66 mL/kg/hr / NET: -0.57 mL/kg/hr    09-16-18 @ 07:01  -  09-17-18 @ 06:45  --------------------------------------------------------  IN: 0.03 mL/kg/hr / OUT: 0.13 mL/kg/hr / NET: -0.1 mL/kg/hr        Physical Exam:  General: NAD, lying comfortably in bed  CV: normal rate (HR 96), regular rhythm, S1+/S2+  Lungs: CTA bilaterally  Abdomen: Soft, appropriately tender, non-distended, +BS  Incision: pfannenstiel incision w/ dermabond, Clean, dry, intact  Ext: No erythema or edema bilaterally    Labs:                        10.1   10.7  )-----------( 187      ( 16 Sep 2018 06:43 )             31.0   baso x      eos x      imm gran x      lymph x      mono x      poly x                            10.7   11.3  )-----------( 207      ( 15 Sep 2018 14:58 )             34.5   baso x      eos x      imm gran x      lymph x      mono x      poly x                            10.9   11.6  )-----------( 220      ( 15 Sep 2018 07:07 )             33.0   baso 0.1    eos 0.1    imm gran x      lymph 14.1   mono 5.5    poly 80.3                         12.3   13.2  )-----------( 237      ( 14 Sep 2018 14:10 )             38.8   baso x      eos x      imm gran x      lymph x      mono x      poly x            MEDICATIONS  (STANDING):  cefoTEtan  IVPB 2 Gram(s) IV Intermittent once  doxycycline hyclate Capsule 100 milliGRAM(s) Oral every 12 hours  heparin  Injectable 5000 Unit(s) SubCutaneous every 12 hours  simethicone 80 milliGRAM(s) Chew every 6 hours  sodium chloride 0.9% lock flush 3 milliLiter(s) IV Push every 8 hours    MEDICATIONS  (PRN):  acetaminophen   Tablet .. 650 milliGRAM(s) Oral every 6 hours PRN Mild Pain (1 - 3)  diphenhydrAMINE   Injectable 50 milliGRAM(s) IV Push every 4 hours PRN pruritis or nausea or sleeplessness  docusate sodium 100 milliGRAM(s) Oral three times a day PRN Stool Softening  naloxone Injectable 0.1 milliGRAM(s) IV Push every 3 minutes PRN For ANY of the following changes in patient status:  A. RR LESS THAN 10 breaths per minute, B. Oxygen saturation LESS THAN 90%, C. Sedation score of 6  ondansetron Injectable 4 milliGRAM(s) IV Push every 6 hours PRN nasea or pruritis  oxyCODONE    IR 5 milliGRAM(s) Oral every 4 hours PRN Severe Pain (7 - 10)
POST-OP CHECK    Allergies:  No Known Allergies    S: Pt awake and alert resting comfortably in bed.  Pain controlled. Pt denies N/V, SOB, CP, palpitations. Tolerates ice chips.  Not OOB yet.    O:   T(C): --  HR: 103 (09-14-18 @ 15:00) (103 - 115)  BP: 109/66 (09-14-18 @ 15:00) (109/66 - 128/73)  RR: 16 (09-14-18 @ 15:00) (16 - 18)  SpO2: 100% (09-14-18 @ 15:00) (99% - 100%)  Wt(kg): --  I&O's Summary    14 Sep 2018 07:01  -  14 Sep 2018 15:11  --------------------------------------------------------  IN: 500 mL / OUT: 460 mL / NET: 40 mL        CV: S1S2, RRR  Lungs: CTA B/L  Abd: soft, appropriately tender, occasional BS x 4 quadrants  Inc: Clean/dry/intact  : no active bleeding- uterine balloon in place- pads x 2 noted to be saturated- changed 3.5 hrs prior  Ext: PAS in place, Neg Homans B/L                 12.3   13.2  )-----------( 237      ( 09-14 @ 14:10 )             38.8       Brief Op Note  Procedure:  Abdominal myomectomy  09/14/2018    Active  [id number].      Operative Findings:  · Operative Findings	20 wk size uterus with multiple myomas. Large 10cm exophytic fibroid with adhesions to left ovary. Another large 10cm submucosal fibroid noted. Total of 14 submucosal fibroids noted and 15 intramural exised. Entrance into uterine cavity noted, 30cc beach balloon postoperatively.	    Specimens/Blood Loss/IV/Output/Protocol/VTE:   Specimens/Blood Loss/IV/Output/Protocol/VTE:  · Specimens	14 submucosal fibroids, 15 intramural fibroids	  · Estimated Blood Loss	500 milliLiter(s)	  · IV Infusions - Crystalloids	3000	  · Urine Output	700 milliLiter(s)
Patient seen and examined at bedside, no acute overnight events. No acute complaints, pain well controlled. Patient is ambulating, voiding spontaneously, and tolerating regular diet. Pt is yet to pass flatus or have a BM. Denies CP, SOB, N/V, fevers, chills, or any other concerns.    Vital Signs Last 24 Hours  T(C): 37.7 (09-16-18 @ 05:20), Max: 37.7 (09-16-18 @ 01:40)  HR: 123 (09-16-18 @ 05:20) (117 - 128)  BP: 111/78 (09-16-18 @ 05:20) (107/74 - 128/76)  RR: 18 (09-16-18 @ 05:20) (18 - 18)  SpO2: 97% (09-16-18 @ 05:20) (94% - 97%)    I&O's Summary    15 Sep 2018 07:01  -  16 Sep 2018 07:00  --------------------------------------------------------  IN: 300 mL / OUT: 2250 mL / NET: -1950 mL        Physical Exam:  General: NAD  CV: NR, RR, S1, S2, no M/R/G  Lungs: CTAB  Abdomen: soft, non-tender, non-distended, +BS  Incision: pfannenstiel incision CDI, dermabond intact  Ext: no pain or swelling    Labs:             10.7<L>  11.3<H> )-----------( 207      ( 09-15 @ 14:58 )             34.5                10.9<L>  11.6<H> )-----------( 220      ( 09-15 @ 07:07 )             33.0<L>               12.3   13.2<H> )-----------( 237      ( 09-14 @ 14:10 )             38.8         MEDICATIONS  (STANDING):  cefoTEtan  IVPB 2 Gram(s) IV Intermittent once  doxycycline hyclate Capsule 100 milliGRAM(s) Oral every 12 hours  heparin  Injectable 5000 Unit(s) SubCutaneous every 12 hours  simethicone 80 milliGRAM(s) Chew every 6 hours  sodium chloride 0.9% lock flush 3 milliLiter(s) IV Push every 8 hours    MEDICATIONS  (PRN):  acetaminophen   Tablet .. 650 milliGRAM(s) Oral every 6 hours PRN Mild Pain (1 - 3)  diphenhydrAMINE   Injectable 50 milliGRAM(s) IV Push every 4 hours PRN pruritis or nausea or sleeplessness  docusate sodium 100 milliGRAM(s) Oral three times a day PRN Stool Softening  naloxone Injectable 0.1 milliGRAM(s) IV Push every 3 minutes PRN For ANY of the following changes in patient status:  A. RR LESS THAN 10 breaths per minute, B. Oxygen saturation LESS THAN 90%, C. Sedation score of 6  ondansetron Injectable 4 milliGRAM(s) IV Push every 6 hours PRN nasea or pruritis  oxyCODONE    IR 5 milliGRAM(s) Oral every 4 hours PRN Severe Pain (7 - 10)
Patient seen and examined at bedside. Patient tachycardic overnight with EKG showing sinus tach. No acute complaints, pain well controlled. Patient is tolerating clears but not yet ambulating. Has not yet passed flatus. Castellanos is still in place. Denies CP, SOB, N/V, fevers, chills, or any other concerns.    Vital Signs Last 24 Hours  T(C): 37.7 (09-15-18 @ 04:57), Max: 37.7 (09-15-18 @ 04:57)  HR: 120 (09-15-18 @ 05:04) (103 - 124)  BP: 107/74 (09-15-18 @ 04:57) (101/67 - 133/83)  RR: 18 (09-15-18 @ 04:57) (16 - 18)  SpO2: 96% (09-15-18 @ 04:57) (93% - 100%)    I&O's Summary    14 Sep 2018 07:01  -  15 Sep 2018 07:00  --------------------------------------------------------  IN: 3140 mL / OUT: 2985 mL / NET: 155 mL        Physical Exam:  General: NAD  CV: NR, RR, S1, S2, no M/R/G  Lungs: CTAB  Abdomen: soft, non-tender, non-distended, +BS  Incision: pfannenstiel incision CDI, steri-strips in place  Ext: no pain or swelling    Labs:             10.9<L>  11.6<H> )-----------( 220      ( 09-15 @ 07:07 )             33.0<L>               12.3   13.2<H> )-----------( 237      ( 09-14 @ 14:10 )             38.8         MEDICATIONS  (STANDING):  cefoTEtan  IVPB 2 Gram(s) IV Intermittent once  doxycycline hyclate Capsule 100 milliGRAM(s) Oral every 12 hours  heparin  Injectable 5000 Unit(s) SubCutaneous every 12 hours  HYDROmorphone (10 MICROgram(s)/mL) + BUpivacaine 0.0625% in 0.9% Sodium Chloride PCEA 250 milliLiter(s) Epidural PCA Continuous  lactated ringers. 1000 milliLiter(s) (125 mL/Hr) IV Continuous <Continuous>    MEDICATIONS  (PRN):  acetaminophen   Tablet .. 650 milliGRAM(s) Oral every 6 hours PRN Mild Pain (1 - 3)  diphenhydrAMINE   Injectable 50 milliGRAM(s) IV Push every 4 hours PRN pruritis or nausea or sleeplessness  docusate sodium 100 milliGRAM(s) Oral three times a day PRN Stool Softening  HYDROmorphone (10 MICROgram(s)/mL) + BUpivacaine 0.0625% in 0.9% Sodium Chloride PCEA Rescue Clinician  Bolus 5 milliLiter(s) Epidural every 15 minutes PRN for Pain Score greater than 6  naloxone Injectable 0.1 milliGRAM(s) IV Push every 3 minutes PRN For ANY of the following changes in patient status:  A. RR LESS THAN 10 breaths per minute, B. Oxygen saturation LESS THAN 90%, C. Sedation score of 6  ondansetron Injectable 4 milliGRAM(s) IV Push every 6 hours PRN nasea or pruritis

## 2018-09-27 ENCOUNTER — APPOINTMENT (OUTPATIENT)
Dept: OBGYN | Facility: CLINIC | Age: 33
End: 2018-09-27
Payer: COMMERCIAL

## 2018-09-27 VITALS
HEART RATE: 86 BPM | SYSTOLIC BLOOD PRESSURE: 113 MMHG | DIASTOLIC BLOOD PRESSURE: 78 MMHG | WEIGHT: 293 LBS | TEMPERATURE: 98.8 F | HEIGHT: 66 IN | BODY MASS INDEX: 47.09 KG/M2

## 2018-09-27 DIAGNOSIS — Z87.42 PERSONAL HISTORY OF OTHER DISEASES OF THE FEMALE GENITAL TRACT: ICD-10-CM

## 2018-09-27 DIAGNOSIS — Z86.018 PERSONAL HISTORY OF OTHER BENIGN NEOPLASM: ICD-10-CM

## 2018-09-27 DIAGNOSIS — Z30.41 ENCOUNTER FOR SURVEILLANCE OF CONTRACEPTIVE PILLS: ICD-10-CM

## 2018-09-27 DIAGNOSIS — N85.2 HYPERTROPHY OF UTERUS: ICD-10-CM

## 2018-09-27 PROBLEM — A86 UNSPECIFIED VIRAL ENCEPHALITIS: Chronic | Status: ACTIVE | Noted: 2018-09-05

## 2018-09-27 PROBLEM — D64.9 ANEMIA, UNSPECIFIED: Chronic | Status: ACTIVE | Noted: 2018-09-05

## 2018-09-27 PROBLEM — D25.9 LEIOMYOMA OF UTERUS, UNSPECIFIED: Chronic | Status: ACTIVE | Noted: 2018-09-05

## 2018-09-27 PROCEDURE — 99024 POSTOP FOLLOW-UP VISIT: CPT

## 2018-09-30 PROBLEM — Z86.018 HISTORY OF UTERINE LEIOMYOMA: Status: RESOLVED | Noted: 2018-06-05 | Resolved: 2018-09-30

## 2018-09-30 PROBLEM — N85.2 ENLARGED UTERUS: Status: RESOLVED | Noted: 2018-06-05 | Resolved: 2018-09-30

## 2018-10-09 ENCOUNTER — APPOINTMENT (OUTPATIENT)
Dept: OBGYN | Facility: CLINIC | Age: 33
End: 2018-10-09

## 2018-10-09 VITALS
WEIGHT: 293 LBS | BODY MASS INDEX: 47.09 KG/M2 | HEART RATE: 85 BPM | TEMPERATURE: 98.4 F | HEIGHT: 66 IN | SYSTOLIC BLOOD PRESSURE: 118 MMHG | DIASTOLIC BLOOD PRESSURE: 78 MMHG

## 2018-10-30 ENCOUNTER — APPOINTMENT (OUTPATIENT)
Dept: OBGYN | Facility: CLINIC | Age: 33
End: 2018-10-30
Payer: COMMERCIAL

## 2018-10-30 VITALS
HEIGHT: 66 IN | BODY MASS INDEX: 47.09 KG/M2 | DIASTOLIC BLOOD PRESSURE: 86 MMHG | WEIGHT: 293 LBS | HEART RATE: 88 BPM | SYSTOLIC BLOOD PRESSURE: 119 MMHG

## 2018-10-30 DIAGNOSIS — D50.0 IRON DEFICIENCY ANEMIA SECONDARY TO BLOOD LOSS (CHRONIC): ICD-10-CM

## 2018-10-30 PROCEDURE — 99024 POSTOP FOLLOW-UP VISIT: CPT

## 2018-11-29 ENCOUNTER — OUTPATIENT (OUTPATIENT)
Dept: OUTPATIENT SERVICES | Facility: HOSPITAL | Age: 33
LOS: 1 days | End: 2018-11-29
Payer: COMMERCIAL

## 2018-11-29 ENCOUNTER — APPOINTMENT (OUTPATIENT)
Dept: ULTRASOUND IMAGING | Facility: CLINIC | Age: 33
End: 2018-11-29
Payer: COMMERCIAL

## 2018-11-29 DIAGNOSIS — Z00.8 ENCOUNTER FOR OTHER GENERAL EXAMINATION: ICD-10-CM

## 2018-11-29 DIAGNOSIS — D25.0 SUBMUCOUS LEIOMYOMA OF UTERUS: ICD-10-CM

## 2018-11-29 PROCEDURE — 58340 CATHETER FOR HYSTEROGRAPHY: CPT

## 2018-11-29 PROCEDURE — 76831 ECHO EXAM UTERUS: CPT | Mod: 26

## 2018-11-29 PROCEDURE — 76831 ECHO EXAM UTERUS: CPT

## 2019-01-02 PROBLEM — D50.0 ANEMIA DUE TO BLOOD LOSS: Status: RESOLVED | Noted: 2018-06-05 | Resolved: 2019-01-02

## 2019-10-03 ENCOUNTER — APPOINTMENT (OUTPATIENT)
Dept: OBGYN | Facility: CLINIC | Age: 34
End: 2019-10-03
Payer: COMMERCIAL

## 2019-10-03 VITALS
BODY MASS INDEX: 47.09 KG/M2 | SYSTOLIC BLOOD PRESSURE: 105 MMHG | WEIGHT: 293 LBS | HEIGHT: 66 IN | HEART RATE: 92 BPM | DIASTOLIC BLOOD PRESSURE: 70 MMHG

## 2019-10-03 DIAGNOSIS — Z98.890 OTHER SPECIFIED POSTPROCEDURAL STATES: ICD-10-CM

## 2019-10-03 DIAGNOSIS — Z86.018 PERSONAL HISTORY OF OTHER BENIGN NEOPLASM: ICD-10-CM

## 2019-10-03 PROCEDURE — 99395 PREV VISIT EST AGE 18-39: CPT

## 2019-10-03 NOTE — HISTORY OF PRESENT ILLNESS
[1 Year Ago] : 1 year ago [Good] : being in good health [Last Pap Smear ___] : last Papanicolaou cytology done [unfilled] [Reproductive Age] : is of reproductive age [Sexually Active] : is sexually active [Monogamous] : is monogamous [Male ___] : [unfilled] male

## 2019-10-03 NOTE — PHYSICAL EXAM
[Awake] : awake [Alert] : alert [Acute Distress] : no acute distress [LAD] : no lymphadenopathy [Thyroid Nodule] : no thyroid nodule [Goiter] : no goiter [Mass] : no breast mass [Nipple Discharge] : no nipple discharge [Axillary LAD] : no axillary lymphadenopathy [Soft] : soft [Tender] : non tender [Distended] : not distended [None] : no CVA tenderness [Oriented x3] : oriented to person, place, and time [Depressed Mood] : not depressed [No Lesions] : no genitalia lesions [Normal] : cervix [Pink Rugae] : pink rugae [Anteversion] : anteverted [Uterine Adnexae] : were not tender and not enlarged [Tenderness] : nontender

## 2019-10-03 NOTE — PROCEDURE
[Affirm (Triple Culture)] : Affirm (triple culture) [Tolerated Well] : the patient tolerated the procedure well [No Complications] : there were no complications

## 2019-10-04 LAB
C TRACH RRNA SPEC QL NAA+PROBE: NOT DETECTED
HBV SURFACE AG SER QL: NONREACTIVE
HCV AB SER QL: NONREACTIVE
HCV S/CO RATIO: 0.12 S/CO
HIV1+2 AB SPEC QL IA.RAPID: NONREACTIVE
N GONORRHOEA RRNA SPEC QL NAA+PROBE: NOT DETECTED
SOURCE AMPLIFICATION: NORMAL
T PALLIDUM AB SER QL IA: NEGATIVE

## 2019-10-06 ENCOUNTER — TRANSCRIPTION ENCOUNTER (OUTPATIENT)
Age: 34
End: 2019-10-06

## 2019-10-12 ENCOUNTER — APPOINTMENT (OUTPATIENT)
Dept: ULTRASOUND IMAGING | Facility: IMAGING CENTER | Age: 34
End: 2019-10-12
Payer: COMMERCIAL

## 2019-10-12 ENCOUNTER — OUTPATIENT (OUTPATIENT)
Dept: OUTPATIENT SERVICES | Facility: HOSPITAL | Age: 34
LOS: 1 days | End: 2019-10-12
Payer: COMMERCIAL

## 2019-10-12 DIAGNOSIS — Z98.890 OTHER SPECIFIED POSTPROCEDURAL STATES: ICD-10-CM

## 2019-10-12 PROCEDURE — 76830 TRANSVAGINAL US NON-OB: CPT | Mod: 26

## 2019-10-12 PROCEDURE — 76830 TRANSVAGINAL US NON-OB: CPT

## 2020-09-15 ENCOUNTER — LABORATORY RESULT (OUTPATIENT)
Age: 35
End: 2020-09-15

## 2020-09-15 ENCOUNTER — APPOINTMENT (OUTPATIENT)
Dept: OBGYN | Facility: CLINIC | Age: 35
End: 2020-09-15
Payer: COMMERCIAL

## 2020-09-15 VITALS
SYSTOLIC BLOOD PRESSURE: 110 MMHG | BODY MASS INDEX: 47.09 KG/M2 | TEMPERATURE: 98.1 F | WEIGHT: 293 LBS | HEIGHT: 66 IN | DIASTOLIC BLOOD PRESSURE: 70 MMHG

## 2020-09-15 DIAGNOSIS — Z01.419 ENCOUNTER FOR GYNECOLOGICAL EXAMINATION (GENERAL) (ROUTINE) W/OUT ABNORMAL FINDINGS: ICD-10-CM

## 2020-09-15 DIAGNOSIS — Z98.890 OTHER SPECIFIED POSTPROCEDURAL STATES: ICD-10-CM

## 2020-09-15 PROCEDURE — 99395 PREV VISIT EST AGE 18-39: CPT

## 2020-09-15 NOTE — PHYSICAL EXAM
[Appropriately responsive] : appropriately responsive [No Lymphadenopathy] : no lymphadenopathy [Soft] : soft [Oriented x3] : oriented x3 [Non-tender] : non-tender [Examination Of The Breasts] : a normal appearance [No Masses] : no breast masses were palpable [Labia Majora] : normal [Normal] : normal [Tenderness] : nontender [Enlarged ___ wks] : enlarged [unfilled] ~Uweeks [Anteversion] : anteverted [Uterine Adnexae] : normal

## 2020-09-15 NOTE — HISTORY OF PRESENT ILLNESS
[N] : Patient does not use contraception [Y] : Patient is sexually active [FreeTextEntry1] : 34 yo presents for follow-up reports menses are regular lasting 5 days.  Happy with surgery results. Sexually active, not ready for pregnancy yet.  She lost some weight precovid but gained it back from being home.  [LMPDate] : 09/05/2020 [MensesFreq] : 28 [MensesLength] : 5 [Currently Active] : currently active [Men] : men

## 2020-09-15 NOTE — COUNSELING
[Pregnancy Options] : pregnancy options [Nutrition/ Exercise/ Weight Management] : nutrition, exercise, weight management [Lab Results] : lab results

## 2020-09-18 LAB
25(OH)D3 SERPL-MCNC: 28.3 NG/ML
ALBUMIN SERPL ELPH-MCNC: 4.3 G/DL
ALP BLD-CCNC: 75 U/L
ALT SERPL-CCNC: 46 U/L
ANION GAP SERPL CALC-SCNC: 13 MMOL/L
AST SERPL-CCNC: 33 U/L
BASOPHILS # BLD AUTO: 0.04 K/UL
BASOPHILS NFR BLD AUTO: 0.5 %
BILIRUB SERPL-MCNC: 0.2 MG/DL
BUN SERPL-MCNC: 7 MG/DL
C TRACH RRNA SPEC QL NAA+PROBE: NOT DETECTED
CALCIUM SERPL-MCNC: 9.4 MG/DL
CHLORIDE SERPL-SCNC: 104 MMOL/L
CHOLEST SERPL-MCNC: 172 MG/DL
CHOLEST/HDLC SERPL: 2.9 RATIO
CO2 SERPL-SCNC: 23 MMOL/L
CREAT SERPL-MCNC: 0.63 MG/DL
EOSINOPHIL # BLD AUTO: 0.15 K/UL
EOSINOPHIL NFR BLD AUTO: 2.1 %
ESTIMATED AVERAGE GLUCOSE: 114 MG/DL
GLUCOSE SERPL-MCNC: 93 MG/DL
HBA1C MFR BLD HPLC: 5.6 %
HBV SURFACE AG SER QL: NONREACTIVE
HCT VFR BLD CALC: 31.7 %
HCV AB SER QL: NONREACTIVE
HCV S/CO RATIO: 0.18 S/CO
HDLC SERPL-MCNC: 59 MG/DL
HGB BLD-MCNC: 8.6 G/DL
HIV1+2 AB SPEC QL IA.RAPID: NONREACTIVE
HPV HIGH+LOW RISK DNA PNL CVX: NOT DETECTED
IMM GRANULOCYTES NFR BLD AUTO: 0.1 %
LDLC SERPL CALC-MCNC: 101 MG/DL
LYMPHOCYTES # BLD AUTO: 2.05 K/UL
LYMPHOCYTES NFR BLD AUTO: 28.1 %
MAN DIFF?: NORMAL
MCHC RBC-ENTMCNC: 19.2 PG
MCHC RBC-ENTMCNC: 27.1 GM/DL
MCV RBC AUTO: 70.8 FL
MONOCYTES # BLD AUTO: 0.69 K/UL
MONOCYTES NFR BLD AUTO: 9.5 %
N GONORRHOEA RRNA SPEC QL NAA+PROBE: NOT DETECTED
NEUTROPHILS # BLD AUTO: 4.35 K/UL
NEUTROPHILS NFR BLD AUTO: 59.7 %
PLATELET # BLD AUTO: 453 K/UL
POTASSIUM SERPL-SCNC: 4.3 MMOL/L
PROT SERPL-MCNC: 7.4 G/DL
RBC # BLD: 4.48 M/UL
RBC # FLD: 21.5 %
SODIUM SERPL-SCNC: 140 MMOL/L
SOURCE AMPLIFICATION: NORMAL
T PALLIDUM AB SER QL IA: NEGATIVE
TRIGL SERPL-MCNC: 59 MG/DL
TSH SERPL-ACNC: 2.39 UIU/ML
VIT B12 SERPL-MCNC: 410 PG/ML
WBC # FLD AUTO: 7.29 K/UL

## 2020-10-02 ENCOUNTER — APPOINTMENT (OUTPATIENT)
Dept: ULTRASOUND IMAGING | Facility: CLINIC | Age: 35
End: 2020-10-02
Payer: COMMERCIAL

## 2020-10-02 ENCOUNTER — OUTPATIENT (OUTPATIENT)
Dept: OUTPATIENT SERVICES | Facility: HOSPITAL | Age: 35
LOS: 1 days | End: 2020-10-02
Payer: COMMERCIAL

## 2020-10-02 DIAGNOSIS — Z98.890 OTHER SPECIFIED POSTPROCEDURAL STATES: ICD-10-CM

## 2020-10-02 PROCEDURE — 76830 TRANSVAGINAL US NON-OB: CPT

## 2020-10-02 PROCEDURE — 76830 TRANSVAGINAL US NON-OB: CPT | Mod: 26

## 2020-10-23 ENCOUNTER — NON-APPOINTMENT (OUTPATIENT)
Age: 35
End: 2020-10-23

## 2021-02-11 NOTE — PRE-OP CHECKLIST - ISOLATION PRECAUTIONS
CONSTITUTIONAL:  No fever or chills, no bodyaches  HEENT:  No sore throat or headache, no nasal congestion  PULM:  No cough or shortness of breath  GI:  No diarrhea, no vomiting none

## 2022-05-16 ENCOUNTER — NON-APPOINTMENT (OUTPATIENT)
Age: 37
End: 2022-05-16

## 2022-05-18 ENCOUNTER — APPOINTMENT (OUTPATIENT)
Dept: OBGYN | Facility: CLINIC | Age: 37
End: 2022-05-18
Payer: COMMERCIAL

## 2022-05-18 VITALS — DIASTOLIC BLOOD PRESSURE: 84 MMHG | BODY MASS INDEX: 50.04 KG/M2 | WEIGHT: 293 LBS | SYSTOLIC BLOOD PRESSURE: 118 MMHG

## 2022-05-18 PROCEDURE — 99395 PREV VISIT EST AGE 18-39: CPT

## 2022-05-18 RX ORDER — MEDROXYPROGESTERONE ACETATE 150 MG/ML
150 INJECTION, SUSPENSION INTRAMUSCULAR
Qty: 1 | Refills: 3 | Status: COMPLETED | COMMUNITY
Start: 2018-04-19 | End: 2022-05-18

## 2022-05-18 RX ORDER — NORETHINDRONE ACETATE 5 MG/1
5 TABLET ORAL
Qty: 50 | Refills: 0 | Status: COMPLETED | COMMUNITY
Start: 2018-05-07 | End: 2022-05-18

## 2022-05-18 RX ORDER — NORETHINDRONE ACETATE 5 MG/1
5 TABLET ORAL
Qty: 1 | Refills: 0 | Status: COMPLETED | COMMUNITY
Start: 2018-07-12 | End: 2022-05-18

## 2022-05-18 NOTE — PHYSICAL EXAM
[Appropriately responsive] : appropriately responsive [Alert] : alert [No Acute Distress] : no acute distress [No Lymphadenopathy] : no lymphadenopathy [Regular Rate Rhythm] : regular rate rhythm [No Murmurs] : no murmurs [Clear to Auscultation B/L] : clear to auscultation bilaterally [Soft] : soft [Non-tender] : non-tender [Non-distended] : non-distended [No HSM] : No HSM [No Lesions] : no lesions [No Mass] : no mass [Oriented x3] : oriented x3 [Examination Of The Breasts] : a normal appearance [No Masses] : no breast masses were palpable [Labia Majora] : normal [Labia Minora] : normal [Normal] : normal [Uterine Adnexae] : normal [FreeTextEntry6] : size can not be determined

## 2022-05-19 LAB — HPV HIGH+LOW RISK DNA PNL CVX: NOT DETECTED

## 2022-05-24 LAB — CYTOLOGY CVX/VAG DOC THIN PREP: NORMAL

## 2023-06-01 ENCOUNTER — APPOINTMENT (OUTPATIENT)
Dept: OBGYN | Facility: CLINIC | Age: 38
End: 2023-06-01
Payer: COMMERCIAL

## 2023-06-01 VITALS
SYSTOLIC BLOOD PRESSURE: 110 MMHG | DIASTOLIC BLOOD PRESSURE: 76 MMHG | WEIGHT: 293 LBS | HEIGHT: 66 IN | BODY MASS INDEX: 47.09 KG/M2

## 2023-06-01 DIAGNOSIS — Z01.419 ENCOUNTER FOR GYNECOLOGICAL EXAMINATION (GENERAL) (ROUTINE) W/OUT ABNORMAL FINDINGS: ICD-10-CM

## 2023-06-01 DIAGNOSIS — N92.0 EXCESSIVE AND FREQUENT MENSTRUATION WITH REGULAR CYCLE: ICD-10-CM

## 2023-06-01 PROCEDURE — 99396 PREV VISIT EST AGE 40-64: CPT

## 2023-06-02 LAB
C TRACH RRNA SPEC QL NAA+PROBE: NOT DETECTED
HPV HIGH+LOW RISK DNA PNL CVX: NOT DETECTED
N GONORRHOEA RRNA SPEC QL NAA+PROBE: NOT DETECTED
SOURCE AMPLIFICATION: NORMAL

## 2023-06-06 LAB — CYTOLOGY CVX/VAG DOC THIN PREP: NORMAL

## 2024-01-25 ENCOUNTER — APPOINTMENT (OUTPATIENT)
Dept: OBGYN | Facility: CLINIC | Age: 39
End: 2024-01-25
Payer: COMMERCIAL

## 2024-01-25 ENCOUNTER — ASOB RESULT (OUTPATIENT)
Age: 39
End: 2024-01-25

## 2024-01-25 VITALS
HEIGHT: 66 IN | SYSTOLIC BLOOD PRESSURE: 118 MMHG | DIASTOLIC BLOOD PRESSURE: 84 MMHG | WEIGHT: 293 LBS | BODY MASS INDEX: 47.09 KG/M2

## 2024-01-25 DIAGNOSIS — Z11.3 ENCOUNTER FOR SCREENING FOR INFECTIONS WITH A PREDOMINANTLY SEXUAL MODE OF TRANSMISSION: ICD-10-CM

## 2024-01-25 DIAGNOSIS — N91.1 SECONDARY AMENORRHEA: ICD-10-CM

## 2024-01-25 LAB — HCG UR QL: NEGATIVE

## 2024-01-25 PROCEDURE — 81025 URINE PREGNANCY TEST: CPT

## 2024-01-25 PROCEDURE — 76830 TRANSVAGINAL US NON-OB: CPT

## 2024-01-25 PROCEDURE — 99214 OFFICE O/P EST MOD 30 MIN: CPT

## 2024-01-28 LAB
C TRACH RRNA SPEC QL NAA+PROBE: NOT DETECTED
N GONORRHOEA RRNA SPEC QL NAA+PROBE: NOT DETECTED
SOURCE AMPLIFICATION: NORMAL

## 2024-03-26 RX ORDER — TRANEXAMIC ACID 650 MG/1
650 TABLET ORAL
Qty: 18 | Refills: 3 | Status: ACTIVE | COMMUNITY
Start: 2023-06-01 | End: 1900-01-01

## 2024-04-22 ENCOUNTER — APPOINTMENT (OUTPATIENT)
Dept: OBGYN | Facility: CLINIC | Age: 39
End: 2024-04-22
Payer: COMMERCIAL

## 2024-04-22 VITALS
DIASTOLIC BLOOD PRESSURE: 86 MMHG | SYSTOLIC BLOOD PRESSURE: 122 MMHG | WEIGHT: 293 LBS | HEIGHT: 66 IN | BODY MASS INDEX: 47.09 KG/M2

## 2024-04-22 DIAGNOSIS — L29.2 PRURITUS VULVAE: ICD-10-CM

## 2024-04-22 DIAGNOSIS — N89.8 OTHER SPECIFIED NONINFLAMMATORY DISORDERS OF VAGINA: ICD-10-CM

## 2024-04-22 PROCEDURE — 99213 OFFICE O/P EST LOW 20 MIN: CPT

## 2024-04-22 PROCEDURE — 99459 PELVIC EXAMINATION: CPT

## 2024-04-22 RX ORDER — FLUCONAZOLE 150 MG/1
150 TABLET ORAL
Qty: 2 | Refills: 0 | Status: ACTIVE | COMMUNITY
Start: 2024-04-22 | End: 1900-01-01

## 2024-04-22 NOTE — HISTORY OF PRESENT ILLNESS
[FreeTextEntry1] : LMP one month ago About two weeks ago she had vaginal and vulvar itching.  She used Monistat 7 and felt better after a few days.  After completing the week she felt well.   About four days later the itching returned.   She does not have heavy discharge   She has no urinary symptoms

## 2024-04-22 NOTE — PHYSICAL EXAM
[Chaperone Present] : A chaperone was present in the examining room during all aspects of the physical examination [[id number]] : A chaperone was present during the pelvic exam. [Labia Majora] : normal [Labia Minora] : normal [Normal] : normal [Uterine Adnexae] : normal [FreeTextEntry1] : mild vulvar irritation

## 2024-04-22 NOTE — DISCUSSION/SUMMARY
[FreeTextEntry1] : discuss probably yeast as she responded to the Monistat at first.   She will be treated with fluconazole x 2 as she had a recurrence shortly after the Monistat and that she is due for a period any day

## 2025-02-05 NOTE — PRE-OP CHECKLIST - BOWEL PREP
Pt. Here for OB/FU. Reports Good FM.   Good # [phone]  Pt. Was [facility] at [facility] on 1/31/2025 for right sided abdominal pain, right upper quadrant.  Pt states still having upper quadrant pain and sometimes very painful.   Pt given 1 HR GTT, RPR and CBC lab slip today along with instructions.      n/a

## 2025-06-23 NOTE — PRE-OP CHECKLIST - BSA (M2)
Pt provided writer with phone number to friend Marlo to arrange pickup for discharge. Writer spoke with marlo, he Is en route. Pt in [facility]. Discharge paperwork provided   2.42